# Patient Record
(demographics unavailable — no encounter records)

---

## 2024-11-20 NOTE — HISTORY OF PRESENT ILLNESS
[Discharged on ___] : discharged on [unfilled] [Discharge Summary] : discharge summary [Discharge Med List] : discharge medication list [Med Reconciliation] : medication reconciliation has been completed [Patient Contacted By: ____] : and contacted by [unfilled] [Post-hospitalization from ___ Hospital] : Post-hospitalization from [unfilled] Hospital [Admitted on: ___] : The patient was admitted on [unfilled] [FreeTextEntry2] : 73 Yo Female with pmhx of morbid obesity, CAD (NSTEMI) no stent, w/ AICD, CVA (x2 2014/2015) HTN, CHF, DM, chronic R hip pain use walker to ambulate, current smoker presented from home c/o Shortness of breath. Patient was in ED 11/11 reactive airway disease, offered OBS but decline and was discharged home with albuterol and prednisone. Patient reports breathing worsened as day progressed which led her back to ed. Reports SOB x3days with ambulation in house.  Admitted for  acute hypoxic respiratory failure  likely 2/2 viral infection iso CHF / reactive airway.  Since dc, denies cp/sob/pnd/orthopnea/parr.  HC RN in home and states pt never picked up doxycycline, carvedilol, or symbicort from pharmacy.   I confirmed with pharmacy they will deliver but pt needs to call with cc info for delivery fee, she will send daughter instead.  Symbicort is $150 so will not be picking up.  Dr. Sorensen made aware from inpatient.  Has appt for PCP MARGO Montelongo on 11/26.  HC numbers given by RN for any questions/concerns.